# Patient Record
Sex: FEMALE | Race: WHITE | NOT HISPANIC OR LATINO | URBAN - METROPOLITAN AREA
[De-identification: names, ages, dates, MRNs, and addresses within clinical notes are randomized per-mention and may not be internally consistent; named-entity substitution may affect disease eponyms.]

---

## 2020-07-25 ENCOUNTER — TELEPHONE ENCOUNTER (OUTPATIENT)
Dept: URBAN - METROPOLITAN AREA CLINIC 13 | Facility: CLINIC | Age: 70
End: 2020-07-25

## 2020-07-25 RX ORDER — RIFAXIMIN 550 MG/1
TAKE 1 TABLET TWICE DAILY TABLET ORAL
Qty: 28 | Refills: 0 | OUTPATIENT
Start: 2014-06-18 | End: 2015-01-14

## 2020-07-25 RX ORDER — MIRABEGRON 50 MG/1
TAKE 1 TABLET DAILY FOR 6 WEEKS TABLET, FILM COATED, EXTENDED RELEASE ORAL
Qty: 30 | Refills: 0 | OUTPATIENT
Start: 2014-06-24 | End: 2015-01-14

## 2020-07-25 RX ORDER — METOCLOPRAMIDE 5 MG/1
TAKE 1 TABLET 3 TIMES DAILY TABLET ORAL
Qty: 90 | Refills: 1 | OUTPATIENT
Start: 2019-05-22 | End: 2019-07-14

## 2020-07-25 RX ORDER — HYOSCYAMINE SULFATE 0.12 MG/1
PLACE 1 TABLET EVERY 4-6 HOURS PRN ABDOMINAL PAIN TABLET, ORALLY DISINTEGRATING ORAL
Qty: 60 | Refills: 2 | OUTPATIENT
End: 2017-06-15

## 2020-07-25 RX ORDER — NITROGLYCERIN 0.4 MG/1
TABLET SUBLINGUAL
Qty: 25 | Refills: 0 | OUTPATIENT
Start: 2017-04-06 | End: 2018-09-05

## 2020-07-25 RX ORDER — FLUTICASONE PROPIONATE 50 UG/1
SPRAY, METERED NASAL
Qty: 16 | Refills: 0 | OUTPATIENT
Start: 2017-09-06 | End: 2018-09-05

## 2020-07-25 RX ORDER — POLYETHYLENE GLYCOL 3350, SODIUM CHLORIDE, SODIUM BICARBONATE AND POTASSIUM CHLORIDE WITH LEMON FLAVOR 420; 11.2; 5.72; 1.48 G/4L; G/4L; G/4L; G/4L
TAKE 1/2 GALLON AT 5:00 PM DAY BEFORE PROCEDURE, TAKE SECOND 1/2 OF GALLON 6 HRS PRIOR TO PROCEDURE POWDER, FOR SOLUTION ORAL
Qty: 1 | Refills: 0 | OUTPATIENT
Start: 2014-05-15 | End: 2014-06-03

## 2020-07-25 RX ORDER — PANTOPRAZOLE SODIUM 40 MG/1
TAKE 1 TABLET DAILY TABLET, DELAYED RELEASE ORAL
Qty: 30 | Refills: 1 | OUTPATIENT
Start: 2014-10-21 | End: 2016-03-23

## 2020-07-25 RX ORDER — HYOSCYAMINE SULFATE 0.12 MG/1
PLACE 1 TABLET UNDER THE TONGUE EVERY 4 TO 6 HOURS AS NEEDED TABLET, ORALLY DISINTEGRATING ORAL
Qty: 60 | Refills: 3 | OUTPATIENT
Start: 2014-05-15 | End: 2015-01-14

## 2020-07-25 RX ORDER — ACETAMINOPHEN, ASPIRIN (NSAID) AND CAFFEINE 250; 250; 65 MG/1; MG/1; MG/1
TAKE TABLET  PRN TABLET, FILM COATED ORAL
Refills: 0 | OUTPATIENT
End: 2018-09-05

## 2020-07-25 RX ORDER — POLYETHYLENE GLYCOL 3350, SODIUM CHLORIDE, SODIUM BICARBONATE AND POTASSIUM CHLORIDE WITH LEMON FLAVOR 420; 11.2; 5.72; 1.48 G/4L; G/4L; G/4L; G/4L
TAKE  ML AS DIRECTED MIX CONTENTS PER DIRECTIONS, BEGIN DRINKING 1/2 SOLUTION @ 5P, COMPLETE REMAINDER OF SOLUTION 6HR PRIOR TO PROCEDURE POWDER, FOR SOLUTION ORAL
Qty: 1 | Refills: 0 | OUTPATIENT
Start: 2018-01-15 | End: 2018-09-05

## 2020-07-25 RX ORDER — POLYETHYLENE GLYCOL 3350, SODIUM CHLORIDE, SODIUM BICARBONATE AND POTASSIUM CHLORIDE WITH LEMON FLAVOR 420; 11.2; 5.72; 1.48 G/4L; G/4L; G/4L; G/4L
TAKE  ML ONCE MIX CONTENTS AND DRINK 1/2 SOLUTION @ 5PM / DRINK REMAINDER OF SOLUTION 6HR PRIOR TO PROCEDURE POWDER, FOR SOLUTION ORAL
Qty: 1 | Refills: 0 | OUTPATIENT
Start: 2018-10-31 | End: 2018-11-16

## 2020-07-25 RX ORDER — METOPROLOL SUCCINATE 25 MG/1
TAKE 1 TABLET DAILY TABLET, EXTENDED RELEASE ORAL
Refills: 0 | OUTPATIENT
End: 2018-09-05

## 2020-07-25 RX ORDER — ESOMEPRAZOLE MAGNESIUM 40 MG
TAKE 1 CAPSULE DAILY CAPSULE,DELAYED RELEASE (ENTERIC COATED) ORAL
Qty: 1 | Refills: 11 | OUTPATIENT
End: 2017-06-15

## 2020-07-25 RX ORDER — WHEAT DEXTRIN 3 G/4 G
TAKE 1 TBSP DAILY POWDER (GRAM) ORAL
Qty: 1 | Refills: 6 | OUTPATIENT
Start: 2014-05-15 | End: 2014-06-03

## 2020-07-25 RX ORDER — LIDOCAINE HCL AND HYDROCORTISONE ACETATE 20; 20 MG/G; MG/G
USE AS DIRECTED CREAM RECTAL
Qty: 1 | Refills: 1 | OUTPATIENT
Start: 2013-04-08 | End: 2015-01-14

## 2020-07-25 RX ORDER — HYOSCYAMINE SULFATE 0.12 MG/1
TAKE TABLET  PRN TABLET ORAL
Refills: 0 | OUTPATIENT
End: 2018-09-05

## 2020-07-26 ENCOUNTER — TELEPHONE ENCOUNTER (OUTPATIENT)
Dept: URBAN - METROPOLITAN AREA CLINIC 13 | Facility: CLINIC | Age: 70
End: 2020-07-26

## 2020-07-26 RX ORDER — CLONAZEPAM 0.5 MG/1
TABLET ORAL
Qty: 30 | Refills: 0 | Status: ACTIVE | COMMUNITY
Start: 2016-01-25

## 2020-07-26 RX ORDER — METOPROLOL SUCCINATE 25 MG/1
TABLET, EXTENDED RELEASE ORAL
Qty: 30 | Refills: 0 | Status: ACTIVE | COMMUNITY
Start: 2017-04-06

## 2020-07-26 RX ORDER — VALSARTAN 320 MG/1
TABLET ORAL
Qty: 30 | Refills: 0 | Status: ACTIVE | COMMUNITY
Start: 2013-07-02

## 2020-07-26 RX ORDER — LIRAGLUTIDE 6 MG/ML
INJECTION SUBCUTANEOUS
Qty: 6 | Refills: 0 | Status: ACTIVE | COMMUNITY
Start: 2013-07-10

## 2020-07-26 RX ORDER — HYDROCODONE BITARTRATE AND ACETAMINOPHEN 7.5; 325 MG/1; MG/1
TABLET ORAL
Qty: 28 | Refills: 0 | Status: ACTIVE | COMMUNITY
Start: 2018-05-07

## 2020-07-26 RX ORDER — BLOOD SUGAR DIAGNOSTIC
STRIP MISCELLANEOUS
Qty: 100 | Refills: 0 | Status: ACTIVE | COMMUNITY
Start: 2014-03-28

## 2020-07-26 RX ORDER — EMPAGLIFLOZIN 10 MG/1
TAKE 1 TABLET BY MOUTH ONCE DAILY TABLET, FILM COATED ORAL
Refills: 0 | Status: ACTIVE | COMMUNITY

## 2020-07-26 RX ORDER — DICLOFENAC SODIUM 1 %
GEL (GRAM) TOPICAL
Qty: 100 | Refills: 0 | Status: ACTIVE | COMMUNITY
Start: 2018-04-26

## 2020-07-26 RX ORDER — LOSARTAN POTASSIUM 100 MG/1
TAKE 1 TABLET ONCE DAILY TABLET, FILM COATED ORAL
Refills: 0 | Status: ACTIVE | COMMUNITY
Start: 2014-10-21

## 2020-07-26 RX ORDER — BLOOD SUGAR DIAGNOSTIC
STRIP MISCELLANEOUS
Qty: 100 | Refills: 0 | Status: ACTIVE | COMMUNITY
Start: 2015-05-22

## 2020-07-26 RX ORDER — LIRAGLUTIDE 6 MG/ML
INJECTION SUBCUTANEOUS
Qty: 6 | Refills: 0 | Status: ACTIVE | COMMUNITY
Start: 2013-06-03

## 2020-07-26 RX ORDER — MUPIROCIN 20 MG/G
OINTMENT TOPICAL
Qty: 22 | Refills: 0 | Status: ACTIVE | COMMUNITY
Start: 2016-02-24

## 2020-07-26 RX ORDER — PRUCALOPRIDE 1 MG/1
TAKE 1 TABLET DAILY TABLET, FILM COATED ORAL
Qty: 30 | Refills: 5 | Status: ACTIVE | COMMUNITY
Start: 2019-04-29

## 2020-07-26 RX ORDER — CEPHALEXIN 500 MG/1
CAPSULE ORAL
Qty: 21 | Refills: 0 | Status: ACTIVE | COMMUNITY
Start: 2018-08-26

## 2020-07-26 RX ORDER — METFORMIN HYDROCHLORIDE 500 MG/1
TABLET, EXTENDED RELEASE ORAL
Qty: 60 | Refills: 0 | Status: ACTIVE | COMMUNITY
Start: 2013-02-11

## 2020-07-26 RX ORDER — NITROGLYCERIN 0.4 MG/1
TABLET SUBLINGUAL
Qty: 25 | Refills: 0 | Status: ACTIVE | COMMUNITY
Start: 2017-04-06

## 2020-07-26 RX ORDER — AMOXICILLIN AND CLAVULANATE POTASSIUM 875; 125 MG/1; MG/1
TABLET, FILM COATED ORAL
Qty: 20 | Refills: 0 | Status: ACTIVE | COMMUNITY
Start: 2013-06-17

## 2020-07-26 RX ORDER — MELOXICAM 7.5 MG/1
TAKE 1 TABLET TWICE DAILY TABLET ORAL
Refills: 0 | Status: ACTIVE | COMMUNITY
Start: 2020-04-06

## 2020-07-26 RX ORDER — VALSARTAN 320 MG/1
TABLET ORAL
Qty: 30 | Refills: 0 | Status: ACTIVE | COMMUNITY
Start: 2013-08-02

## 2020-07-26 RX ORDER — CLONAZEPAM 0.5 MG/1
TABLET ORAL
Qty: 30 | Refills: 0 | Status: ACTIVE | COMMUNITY
Start: 2017-04-17

## 2020-07-26 RX ORDER — MOMETASONE FUROATE 1 MG/ML
SOLUTION TOPICAL
Qty: 30 | Refills: 0 | Status: ACTIVE | COMMUNITY
Start: 2019-09-25

## 2020-07-26 RX ORDER — PRAVASTATIN SODIUM 40 MG/1
TAKE 1 TABLET DAILY AS DIRECTED TABLET ORAL
Refills: 0 | Status: ACTIVE | COMMUNITY
Start: 2014-10-21

## 2020-07-26 RX ORDER — LIRAGLUTIDE 6 MG/ML
INJECTION SUBCUTANEOUS
Qty: 6 | Refills: 0 | Status: ACTIVE | COMMUNITY
Start: 2013-08-19

## 2020-07-26 RX ORDER — CYCLOSPORINE 0.5 MG/ML
EMULSION OPHTHALMIC
Qty: 6 | Refills: 0 | Status: ACTIVE | COMMUNITY
Start: 2019-01-14

## 2020-07-26 RX ORDER — PANTOPRAZOLE SODIUM 40 MG/1
TAKE 1 TABLET DAILY TABLET, DELAYED RELEASE ORAL
Refills: 11 | Status: ACTIVE | COMMUNITY

## 2020-07-26 RX ORDER — METFORMIN HYDROCHLORIDE 500 MG/1
TABLET, EXTENDED RELEASE ORAL
Qty: 120 | Refills: 0 | Status: ACTIVE | COMMUNITY
Start: 2014-12-08

## 2020-07-26 RX ORDER — MELOXICAM 7.5 MG/1
TABLET ORAL
Qty: 60 | Refills: 0 | Status: ACTIVE | COMMUNITY
Start: 2018-05-07

## 2020-07-26 RX ORDER — GABAPENTIN 300 MG/1
TAKE 1 CAPSULE TWICE DAILY CAPSULE ORAL
Refills: 0 | Status: ACTIVE | COMMUNITY
Start: 2014-10-21

## 2020-07-26 RX ORDER — BLOOD SUGAR DIAGNOSTIC
STRIP MISCELLANEOUS
Qty: 50 | Refills: 0 | Status: ACTIVE | COMMUNITY
Start: 2012-08-15

## 2020-07-26 RX ORDER — MELOXICAM 7.5 MG/1
TABLET ORAL
Qty: 60 | Refills: 0 | Status: ACTIVE | COMMUNITY
Start: 2018-04-05

## 2020-07-26 RX ORDER — VALSARTAN 320 MG/1
TABLET ORAL
Qty: 30 | Refills: 0 | Status: ACTIVE | COMMUNITY
Start: 2012-04-10

## 2020-07-26 RX ORDER — NITROFURANTOIN MONOHYDRATE/MACROCRYSTALLINE 25; 75 MG/1; MG/1
CAPSULE ORAL
Qty: 10 | Refills: 0 | Status: ACTIVE | COMMUNITY
Start: 2019-05-14

## 2020-07-26 RX ORDER — METFORMIN HYDROCHLORIDE 500 MG/1
TABLET, EXTENDED RELEASE ORAL
Qty: 60 | Refills: 0 | Status: ACTIVE | COMMUNITY
Start: 2013-07-02

## 2020-07-26 RX ORDER — LORAZEPAM 0.5 MG/1
TABLET ORAL
Qty: 40 | Refills: 0 | Status: ACTIVE | COMMUNITY
Start: 2015-09-08

## 2020-07-26 RX ORDER — LIRAGLUTIDE 6 MG/ML
INJECT 18 MG DAILY INJECTION SUBCUTANEOUS
Refills: 0 | Status: ACTIVE | COMMUNITY
Start: 2015-01-12

## 2020-07-26 RX ORDER — METFORMIN HYDROCHLORIDE 500 MG/1
TABLET, EXTENDED RELEASE ORAL
Qty: 60 | Refills: 0 | Status: ACTIVE | COMMUNITY
Start: 2013-05-31

## 2020-07-26 RX ORDER — AMOXICILLIN 500 MG/1
CAPSULE ORAL
Qty: 20 | Refills: 0 | Status: ACTIVE | COMMUNITY
Start: 2017-09-06

## 2020-07-26 RX ORDER — METFORMIN HYDROCHLORIDE 500 MG/1
TABLET, EXTENDED RELEASE ORAL
Qty: 60 | Refills: 0 | Status: ACTIVE | COMMUNITY
Start: 2013-08-12

## 2020-07-26 RX ORDER — ASPIRIN 81 MG/1
TAKE 1 TABLET DAILY AS DIRECTED TABLET, DELAYED RELEASE ORAL
Refills: 0 | Status: ACTIVE | COMMUNITY

## 2020-07-26 RX ORDER — EMPAGLIFLOZIN 10 MG/1
TABLET, FILM COATED ORAL
Qty: 30 | Refills: 0 | Status: ACTIVE | COMMUNITY
Start: 2018-08-20

## 2020-07-26 RX ORDER — LEVOTHYROXINE SODIUM 125 UG/1
TABLET ORAL
Qty: 90 | Refills: 0 | Status: ACTIVE | COMMUNITY
Start: 2017-11-17

## 2020-07-26 RX ORDER — CYCLOSPORINE 0.5 MG/ML
INSTILL 1 DROP IN EACH EYE TWICE DAILY EMULSION OPHTHALMIC
Refills: 0 | Status: ACTIVE | COMMUNITY
Start: 2020-01-27

## 2020-07-26 RX ORDER — CEPHALEXIN 500 MG/1
CAPSULE ORAL
Qty: 14 | Refills: 0 | Status: ACTIVE | COMMUNITY
Start: 2017-03-30

## 2020-07-26 RX ORDER — CYCLOBENZAPRINE HYDROCHLORIDE 10 MG/1
TABLET, FILM COATED ORAL
Qty: 20 | Refills: 0 | Status: ACTIVE | COMMUNITY
Start: 2017-12-18

## 2020-07-26 RX ORDER — MUPIROCIN 20 MG/G
OINTMENT TOPICAL
Qty: 22 | Refills: 0 | Status: ACTIVE | COMMUNITY
Start: 2019-09-25

## 2020-07-26 RX ORDER — NYSTATIN 100000 [USP'U]/G
POWDER TOPICAL
Qty: 60 | Refills: 0 | Status: ACTIVE | COMMUNITY
Start: 2019-08-20

## 2020-07-26 RX ORDER — FLUTICASONE PROPIONATE 50 UG/1
SPRAY, METERED NASAL
Qty: 16 | Refills: 0 | Status: ACTIVE | COMMUNITY
Start: 2017-09-06

## 2020-07-26 RX ORDER — FLUCONAZOLE 150 MG/1
TABLET ORAL
Qty: 1 | Refills: 0 | Status: ACTIVE | COMMUNITY
Start: 2017-09-06

## 2020-07-26 RX ORDER — CLONAZEPAM 0.5 MG/1
TABLET ORAL
Qty: 30 | Refills: 0 | Status: ACTIVE | COMMUNITY
Start: 2017-02-06

## 2020-07-26 RX ORDER — CLONAZEPAM 0.5 MG/1
TABLET ORAL
Qty: 30 | Refills: 0 | Status: ACTIVE | COMMUNITY
Start: 2017-03-10

## 2020-07-26 RX ORDER — CEPHALEXIN 500 MG/1
CAPSULE ORAL
Qty: 28 | Refills: 0 | Status: ACTIVE | COMMUNITY
Start: 2016-02-24

## 2020-10-26 ENCOUNTER — WEB ENCOUNTER (OUTPATIENT)
Dept: URBAN - METROPOLITAN AREA CLINIC 113 | Facility: CLINIC | Age: 70
End: 2020-10-26

## 2020-10-26 ENCOUNTER — OFFICE VISIT (OUTPATIENT)
Dept: URBAN - METROPOLITAN AREA CLINIC 113 | Facility: CLINIC | Age: 70
End: 2020-10-26
Payer: MEDICARE

## 2020-10-26 VITALS
HEIGHT: 66 IN | HEART RATE: 93 BPM | WEIGHT: 220 LBS | DIASTOLIC BLOOD PRESSURE: 91 MMHG | BODY MASS INDEX: 35.36 KG/M2 | SYSTOLIC BLOOD PRESSURE: 137 MMHG | TEMPERATURE: 97.5 F

## 2020-10-26 DIAGNOSIS — K58.1 IRRITABLE BOWEL SYNDROME WITH CONSTIPATION: ICD-10-CM

## 2020-10-26 DIAGNOSIS — K59.09 CHRONIC CONSTIPATION: ICD-10-CM

## 2020-10-26 DIAGNOSIS — D36.9 TUBULOVILLOUS ADENOMA: ICD-10-CM

## 2020-10-26 PROBLEM — 440630006: Status: ACTIVE | Noted: 2020-10-26

## 2020-10-26 PROBLEM — 443416007: Status: ACTIVE | Noted: 2020-10-26

## 2020-10-26 PROCEDURE — G9904 DOC MED RSN NO TBCO SCRN: HCPCS | Performed by: INTERNAL MEDICINE

## 2020-10-26 PROCEDURE — G8417 CALC BMI ABV UP PARAM F/U: HCPCS | Performed by: INTERNAL MEDICINE

## 2020-10-26 PROCEDURE — 3017F COLORECTAL CA SCREEN DOC REV: CPT | Performed by: INTERNAL MEDICINE

## 2020-10-26 PROCEDURE — G8427 DOCREV CUR MEDS BY ELIG CLIN: HCPCS | Performed by: INTERNAL MEDICINE

## 2020-10-26 PROCEDURE — 99214 OFFICE O/P EST MOD 30 MIN: CPT | Performed by: INTERNAL MEDICINE

## 2020-10-26 RX ORDER — FLUCONAZOLE 150 MG/1
TABLET ORAL
Qty: 1 | Refills: 0 | Status: ON HOLD | COMMUNITY
Start: 2017-09-06

## 2020-10-26 RX ORDER — PRUCALOPRIDE 1 MG/1
TAKE 1 TABLET DAILY TABLET, FILM COATED ORAL
Qty: 30 | Refills: 5 | Status: ON HOLD | COMMUNITY
Start: 2019-04-29

## 2020-10-26 RX ORDER — MOMETASONE FUROATE 1 MG/ML
SOLUTION TOPICAL
Qty: 30 | Refills: 0 | Status: ON HOLD | COMMUNITY
Start: 2019-09-25

## 2020-10-26 RX ORDER — PRAVASTATIN SODIUM 40 MG/1
TAKE 1 TABLET DAILY AS DIRECTED TABLET ORAL
Refills: 0 | Status: ACTIVE | COMMUNITY
Start: 2014-10-21

## 2020-10-26 RX ORDER — BLOOD SUGAR DIAGNOSTIC
STRIP MISCELLANEOUS
Qty: 50 | Refills: 0 | Status: ON HOLD | COMMUNITY
Start: 2012-08-15

## 2020-10-26 RX ORDER — MELOXICAM 7.5 MG/1
TABLET ORAL
Qty: 60 | Refills: 0 | Status: ON HOLD | COMMUNITY
Start: 2018-04-05

## 2020-10-26 RX ORDER — LORAZEPAM 0.5 MG/1
TABLET ORAL
Qty: 40 | Refills: 0 | Status: ON HOLD | COMMUNITY
Start: 2015-09-08

## 2020-10-26 RX ORDER — MUPIROCIN 20 MG/G
OINTMENT TOPICAL
Qty: 22 | Refills: 0 | Status: ON HOLD | COMMUNITY
Start: 2016-02-24

## 2020-10-26 RX ORDER — DULOXETINE 60 MG/1
1 CAPSULE CAPSULE, DELAYED RELEASE PELLETS ORAL TWICE A DAY
Status: ACTIVE | COMMUNITY

## 2020-10-26 RX ORDER — VALSARTAN 320 MG/1
TABLET ORAL
Qty: 30 | Refills: 0 | Status: ON HOLD | COMMUNITY
Start: 2012-04-10

## 2020-10-26 RX ORDER — CEPHALEXIN 500 MG/1
CAPSULE ORAL
Qty: 28 | Refills: 0 | Status: ON HOLD | COMMUNITY
Start: 2016-02-24

## 2020-10-26 RX ORDER — GABAPENTIN 300 MG/1
TAKE 1 CAPSULE TWICE DAILY CAPSULE ORAL
Refills: 0 | Status: ACTIVE | COMMUNITY
Start: 2014-10-21

## 2020-10-26 RX ORDER — NITROFURANTOIN MONOHYDRATE/MACROCRYSTALLINE 25; 75 MG/1; MG/1
CAPSULE ORAL
Qty: 10 | Refills: 0 | Status: ON HOLD | COMMUNITY
Start: 2019-05-14

## 2020-10-26 RX ORDER — AMOXICILLIN 500 MG/1
CAPSULE ORAL
Qty: 20 | Refills: 0 | Status: ON HOLD | COMMUNITY
Start: 2017-09-06

## 2020-10-26 RX ORDER — LIRAGLUTIDE 6 MG/ML
INJECTION SUBCUTANEOUS
Qty: 6 | Refills: 0 | Status: ON HOLD | COMMUNITY
Start: 2013-06-03

## 2020-10-26 RX ORDER — ASPIRIN 81 MG/1
TAKE 1 TABLET DAILY AS DIRECTED TABLET, DELAYED RELEASE ORAL
Refills: 0 | Status: ON HOLD | COMMUNITY

## 2020-10-26 RX ORDER — AMOXICILLIN AND CLAVULANATE POTASSIUM 875; 125 MG/1; MG/1
TABLET, FILM COATED ORAL
Qty: 20 | Refills: 0 | Status: ON HOLD | COMMUNITY
Start: 2013-06-17

## 2020-10-26 RX ORDER — POLYETHYLENE GLYCOL 3350, SODIUM CHLORIDE, SODIUM BICARBONATE AND POTASSIUM CHLORIDE 420G
AS DIRECTED KIT ORAL ONCE
Qty: 4000 ML | Refills: 0 | OUTPATIENT
Start: 2020-10-26 | End: 2020-10-27

## 2020-10-26 RX ORDER — CYCLOBENZAPRINE HYDROCHLORIDE 10 MG/1
TABLET, FILM COATED ORAL
Qty: 20 | Refills: 0 | Status: ON HOLD | COMMUNITY
Start: 2017-12-18

## 2020-10-26 RX ORDER — CYCLOSPORINE 0.5 MG/ML
EMULSION OPHTHALMIC
Qty: 6 | Refills: 0 | Status: ON HOLD | COMMUNITY
Start: 2019-01-14

## 2020-10-26 RX ORDER — LOSARTAN POTASSIUM 100 MG/1
TAKE 1 TABLET ONCE DAILY TABLET, FILM COATED ORAL
Refills: 0 | Status: ACTIVE | COMMUNITY
Start: 2014-10-21

## 2020-10-26 RX ORDER — METOCLOPRAMIDE 5 MG/1
1 TABLET ON THE TONGUE AND ALLOW TO DISSOLVE BEFORE MEALS TABLET ORAL
Status: ACTIVE | COMMUNITY

## 2020-10-26 RX ORDER — HYDROCODONE BITARTRATE AND ACETAMINOPHEN 7.5; 325 MG/1; MG/1
TABLET ORAL
Qty: 28 | Refills: 0 | Status: ON HOLD | COMMUNITY
Start: 2018-05-07

## 2020-10-26 RX ORDER — FLUTICASONE PROPIONATE 50 UG/1
SPRAY, METERED NASAL
Qty: 16 | Refills: 0 | Status: ON HOLD | COMMUNITY
Start: 2017-09-06

## 2020-10-26 RX ORDER — ALPRAZOLAM 0.25 MG/1
1 TABLET PRN TABLET ORAL
Status: ACTIVE | COMMUNITY

## 2020-10-26 RX ORDER — DICLOFENAC SODIUM 1 %
GEL (GRAM) TOPICAL
Qty: 100 | Refills: 0 | Status: ON HOLD | COMMUNITY
Start: 2018-04-26

## 2020-10-26 RX ORDER — EMPAGLIFLOZIN 10 MG/1
TAKE 1 TABLET BY MOUTH ONCE DAILY TABLET, FILM COATED ORAL
Refills: 0 | Status: ACTIVE | COMMUNITY

## 2020-10-26 RX ORDER — METOPROLOL SUCCINATE 25 MG/1
TABLET, EXTENDED RELEASE ORAL
Qty: 30 | Refills: 0 | Status: ON HOLD | COMMUNITY
Start: 2017-04-06

## 2020-10-26 RX ORDER — LEVOTHYROXINE SODIUM 125 UG/1
TABLET ORAL
Qty: 90 | Refills: 0 | Status: ACTIVE | COMMUNITY
Start: 2017-11-17

## 2020-10-26 RX ORDER — METFORMIN HYDROCHLORIDE 500 MG/1
TABLET, EXTENDED RELEASE ORAL
Qty: 60 | Refills: 0 | Status: ON HOLD | COMMUNITY
Start: 2013-02-11

## 2020-10-26 RX ORDER — CLONAZEPAM 0.5 MG/1
TABLET ORAL
Qty: 30 | Refills: 0 | Status: ON HOLD | COMMUNITY
Start: 2016-01-25

## 2020-10-26 RX ORDER — PANTOPRAZOLE SODIUM 40 MG/1
TAKE 1 TABLET DAILY TABLET, DELAYED RELEASE ORAL
Refills: 11 | Status: ACTIVE | COMMUNITY

## 2020-10-26 RX ORDER — NYSTATIN 100000 [USP'U]/G
POWDER TOPICAL
Qty: 60 | Refills: 0 | Status: ON HOLD | COMMUNITY
Start: 2019-08-20

## 2020-10-26 RX ORDER — NITROGLYCERIN 0.4 MG/1
TABLET SUBLINGUAL
Qty: 25 | Refills: 0 | Status: ON HOLD | COMMUNITY
Start: 2017-04-06

## 2020-10-26 NOTE — PHYSICAL EXAM HENT:
Head, normocephalic, atraumatic, Face, Face within normal limits, Ears, External ears within normal limits, Nose/Nasopharynx, External nose  normal appearance, nares patent, no nasal discharge, Mouth and Throat, Oral cavity appearance normal, Breath odor normal, Lips, Appearance normal generalized

## 2020-10-26 NOTE — HPI-TODAY'S VISIT:
Ms. Sepulveda is a 70-year-old female with a history of diabetes, adenomatous colon polyps, GERD, fatty liver, mixed type irritable bowel syndrome, sleep apnea on CPAP, and anemia presenting for constipation follow-up.  She was last seen April 2020.  She is having more constipation recently despite Miralax and stool softners.  She has a BM every 2-3 days, but has straining and small pebble like stools.  She uses  cleansers as needed.  She has occasional nausea without vomiting.  She denies  abdominal pain, blood  in the stool, weight loss, fevers or chills.  She was previously tried on Motegrity, but insurance did not approve this. She has not tried Linzess, Trulance or Amitiza.  Review of record Smart pill 4/15/19 revealed slightly delayed gastric emptying at 5 hours 3 minutes, she did have a second meal which likely falsely delayed gastric emptying, small bowel transit times were delayed at approximately 12 hours with normal colonic transit times.  EGD 3/8/19 revealed a 3 cm hiatal hernia, otherwise normal esophagus, retained gastric fluid, mild gastritis and normal duodenum. The gastroesophageal flap valve was classified as Hill Grade III.  Colonoscopy 11/16/18: Findings included BBPS 6, removal of a 10 mm tubular adenomatous polyp in the cecum, piecemeal removal of a 15 mm tubulovillous adenoma in the ascending colon, removal of an 8 mm tubular adenoma in the ascending colon, and removal of nine 2 to 6 mm tubular adenomatous and hyperplastic polyps in the ascending and cecum. Repeat surveillance colonoscopy is recommended in one year.

## 2020-11-09 ENCOUNTER — OFFICE VISIT (OUTPATIENT)
Dept: URBAN - METROPOLITAN AREA SURGERY CENTER 25 | Facility: SURGERY CENTER | Age: 70
End: 2020-11-09
Payer: MEDICARE

## 2020-11-09 ENCOUNTER — CLAIMS CREATED FROM THE CLAIM WINDOW (OUTPATIENT)
Dept: URBAN - METROPOLITAN AREA CLINIC 4 | Facility: CLINIC | Age: 70
End: 2020-11-09
Payer: MEDICARE

## 2020-11-09 DIAGNOSIS — Z86.010 H/O ADENOMATOUS POLYP OF COLON: ICD-10-CM

## 2020-11-09 DIAGNOSIS — D12.4 BENIGN NEOPLASM OF DESCENDING COLON: ICD-10-CM

## 2020-11-09 DIAGNOSIS — D12.2 BENIGN NEOPLASM OF ASCENDING COLON: ICD-10-CM

## 2020-11-09 DIAGNOSIS — D12.4 ADENOMA OF DESCENDING COLON: ICD-10-CM

## 2020-11-09 DIAGNOSIS — D12.6 ADENOMA DETERMINED BY COLORECTAL BIOPSY: ICD-10-CM

## 2020-11-09 PROCEDURE — G9936 PMH PLYP/NEO CO/RECT/JUN/ANS: HCPCS | Performed by: INTERNAL MEDICINE

## 2020-11-09 PROCEDURE — 88305 TISSUE EXAM BY PATHOLOGIST: CPT | Performed by: PATHOLOGY

## 2020-11-09 PROCEDURE — 45385 COLONOSCOPY W/LESION REMOVAL: CPT | Performed by: INTERNAL MEDICINE

## 2020-11-09 PROCEDURE — G8907 PT DOC NO EVENTS ON DISCHARG: HCPCS | Performed by: INTERNAL MEDICINE

## 2020-11-09 RX ORDER — FLUCONAZOLE 150 MG/1
TABLET ORAL
Qty: 1 | Refills: 0 | Status: ON HOLD | COMMUNITY
Start: 2017-09-06

## 2020-11-09 RX ORDER — GABAPENTIN 300 MG/1
TAKE 1 CAPSULE TWICE DAILY CAPSULE ORAL
Refills: 0 | Status: ACTIVE | COMMUNITY
Start: 2014-10-21

## 2020-11-09 RX ORDER — CEPHALEXIN 500 MG/1
CAPSULE ORAL
Qty: 28 | Refills: 0 | Status: ON HOLD | COMMUNITY
Start: 2016-02-24

## 2020-11-09 RX ORDER — ASPIRIN 81 MG/1
TAKE 1 TABLET DAILY AS DIRECTED TABLET, DELAYED RELEASE ORAL
Refills: 0 | Status: ON HOLD | COMMUNITY

## 2020-11-09 RX ORDER — NITROGLYCERIN 0.4 MG/1
TABLET SUBLINGUAL
Qty: 25 | Refills: 0 | Status: ON HOLD | COMMUNITY
Start: 2017-04-06

## 2020-11-09 RX ORDER — AMOXICILLIN AND CLAVULANATE POTASSIUM 875; 125 MG/1; MG/1
TABLET, FILM COATED ORAL
Qty: 20 | Refills: 0 | Status: ON HOLD | COMMUNITY
Start: 2013-06-17

## 2020-11-09 RX ORDER — BLOOD SUGAR DIAGNOSTIC
STRIP MISCELLANEOUS
Qty: 50 | Refills: 0 | Status: ON HOLD | COMMUNITY
Start: 2012-08-15

## 2020-11-09 RX ORDER — METOCLOPRAMIDE 5 MG/1
1 TABLET ON THE TONGUE AND ALLOW TO DISSOLVE BEFORE MEALS TABLET ORAL
Status: ACTIVE | COMMUNITY

## 2020-11-09 RX ORDER — DULOXETINE 60 MG/1
1 CAPSULE CAPSULE, DELAYED RELEASE PELLETS ORAL TWICE A DAY
Status: ACTIVE | COMMUNITY

## 2020-11-09 RX ORDER — MOMETASONE FUROATE 1 MG/ML
SOLUTION TOPICAL
Qty: 30 | Refills: 0 | Status: ON HOLD | COMMUNITY
Start: 2019-09-25

## 2020-11-09 RX ORDER — FLUTICASONE PROPIONATE 50 UG/1
SPRAY, METERED NASAL
Qty: 16 | Refills: 0 | Status: ON HOLD | COMMUNITY
Start: 2017-09-06

## 2020-11-09 RX ORDER — HYDROCODONE BITARTRATE AND ACETAMINOPHEN 7.5; 325 MG/1; MG/1
TABLET ORAL
Qty: 28 | Refills: 0 | Status: ON HOLD | COMMUNITY
Start: 2018-05-07

## 2020-11-09 RX ORDER — NITROFURANTOIN MONOHYDRATE/MACROCRYSTALLINE 25; 75 MG/1; MG/1
CAPSULE ORAL
Qty: 10 | Refills: 0 | Status: ON HOLD | COMMUNITY
Start: 2019-05-14

## 2020-11-09 RX ORDER — NYSTATIN 100000 [USP'U]/G
POWDER TOPICAL
Qty: 60 | Refills: 0 | Status: ON HOLD | COMMUNITY
Start: 2019-08-20

## 2020-11-09 RX ORDER — MELOXICAM 7.5 MG/1
TABLET ORAL
Qty: 60 | Refills: 0 | Status: ON HOLD | COMMUNITY
Start: 2018-04-05

## 2020-11-09 RX ORDER — ALPRAZOLAM 0.25 MG/1
1 TABLET PRN TABLET ORAL
Status: ACTIVE | COMMUNITY

## 2020-11-09 RX ORDER — LORAZEPAM 0.5 MG/1
TABLET ORAL
Qty: 40 | Refills: 0 | Status: ON HOLD | COMMUNITY
Start: 2015-09-08

## 2020-11-09 RX ORDER — VALSARTAN 320 MG/1
TABLET ORAL
Qty: 30 | Refills: 0 | Status: ON HOLD | COMMUNITY
Start: 2012-04-10

## 2020-11-09 RX ORDER — CYCLOSPORINE 0.5 MG/ML
EMULSION OPHTHALMIC
Qty: 6 | Refills: 0 | Status: ON HOLD | COMMUNITY
Start: 2019-01-14

## 2020-11-09 RX ORDER — PRAVASTATIN SODIUM 40 MG/1
TAKE 1 TABLET DAILY AS DIRECTED TABLET ORAL
Refills: 0 | Status: ACTIVE | COMMUNITY
Start: 2014-10-21

## 2020-11-09 RX ORDER — LINACLOTIDE 145 UG/1
1 CAPSULE AT LEAST 30 MINUTES BEFORE THE FIRST MEAL OF THE DAY ON AN EMPTY STOMACH CAPSULE, GELATIN COATED ORAL ONCE A DAY
Qty: 90 | Refills: 3 | OUTPATIENT
Start: 2020-11-09 | End: 2021-11-04

## 2020-11-09 RX ORDER — EMPAGLIFLOZIN 10 MG/1
TAKE 1 TABLET BY MOUTH ONCE DAILY TABLET, FILM COATED ORAL
Refills: 0 | Status: ACTIVE | COMMUNITY

## 2020-11-09 RX ORDER — MUPIROCIN 20 MG/G
OINTMENT TOPICAL
Qty: 22 | Refills: 0 | Status: ON HOLD | COMMUNITY
Start: 2016-02-24

## 2020-11-09 RX ORDER — LIRAGLUTIDE 6 MG/ML
INJECTION SUBCUTANEOUS
Qty: 6 | Refills: 0 | Status: ON HOLD | COMMUNITY
Start: 2013-06-03

## 2020-11-09 RX ORDER — PANTOPRAZOLE SODIUM 40 MG/1
TAKE 1 TABLET DAILY TABLET, DELAYED RELEASE ORAL
Refills: 11 | Status: ACTIVE | COMMUNITY

## 2020-11-09 RX ORDER — DICLOFENAC SODIUM 1 %
GEL (GRAM) TOPICAL
Qty: 100 | Refills: 0 | Status: ON HOLD | COMMUNITY
Start: 2018-04-26

## 2020-11-09 RX ORDER — AMOXICILLIN 500 MG/1
CAPSULE ORAL
Qty: 20 | Refills: 0 | Status: ON HOLD | COMMUNITY
Start: 2017-09-06

## 2020-11-09 RX ORDER — METFORMIN HYDROCHLORIDE 500 MG/1
TABLET, EXTENDED RELEASE ORAL
Qty: 60 | Refills: 0 | Status: ON HOLD | COMMUNITY
Start: 2013-02-11

## 2020-11-09 RX ORDER — PRUCALOPRIDE 1 MG/1
TAKE 1 TABLET DAILY TABLET, FILM COATED ORAL
Qty: 30 | Refills: 5 | Status: ON HOLD | COMMUNITY
Start: 2019-04-29

## 2020-11-09 RX ORDER — CLONAZEPAM 0.5 MG/1
TABLET ORAL
Qty: 30 | Refills: 0 | Status: ON HOLD | COMMUNITY
Start: 2016-01-25

## 2020-11-09 RX ORDER — METOPROLOL SUCCINATE 25 MG/1
TABLET, EXTENDED RELEASE ORAL
Qty: 30 | Refills: 0 | Status: ON HOLD | COMMUNITY
Start: 2017-04-06

## 2020-11-09 RX ORDER — LOSARTAN POTASSIUM 100 MG/1
TAKE 1 TABLET ONCE DAILY TABLET, FILM COATED ORAL
Refills: 0 | Status: ACTIVE | COMMUNITY
Start: 2014-10-21

## 2020-11-09 RX ORDER — CYCLOBENZAPRINE HYDROCHLORIDE 10 MG/1
TABLET, FILM COATED ORAL
Qty: 20 | Refills: 0 | Status: ON HOLD | COMMUNITY
Start: 2017-12-18

## 2020-11-09 RX ORDER — LEVOTHYROXINE SODIUM 125 UG/1
TABLET ORAL
Qty: 90 | Refills: 0 | Status: ACTIVE | COMMUNITY
Start: 2017-11-17

## 2020-12-09 ENCOUNTER — OFFICE VISIT (OUTPATIENT)
Dept: URBAN - METROPOLITAN AREA CLINIC 113 | Facility: CLINIC | Age: 70
End: 2020-12-09

## 2021-01-26 ENCOUNTER — OFFICE VISIT (OUTPATIENT)
Dept: URBAN - METROPOLITAN AREA CLINIC 113 | Facility: CLINIC | Age: 71
End: 2021-01-26
Payer: MEDICARE

## 2021-01-26 VITALS
BODY MASS INDEX: 35.36 KG/M2 | HEIGHT: 66 IN | SYSTOLIC BLOOD PRESSURE: 126 MMHG | DIASTOLIC BLOOD PRESSURE: 84 MMHG | TEMPERATURE: 97.7 F | HEART RATE: 93 BPM | WEIGHT: 220 LBS

## 2021-01-26 DIAGNOSIS — K58.2 IRRITABLE BOWEL SYNDROME WITH BOTH CONSTIPATION AND DIARRHEA: ICD-10-CM

## 2021-01-26 DIAGNOSIS — D36.9 TUBULAR ADENOMA: ICD-10-CM

## 2021-01-26 DIAGNOSIS — K59.09 CHRONIC CONSTIPATION: ICD-10-CM

## 2021-01-26 PROCEDURE — 3017F COLORECTAL CA SCREEN DOC REV: CPT | Performed by: INTERNAL MEDICINE

## 2021-01-26 PROCEDURE — G8417 CALC BMI ABV UP PARAM F/U: HCPCS | Performed by: INTERNAL MEDICINE

## 2021-01-26 PROCEDURE — G8427 DOCREV CUR MEDS BY ELIG CLIN: HCPCS | Performed by: INTERNAL MEDICINE

## 2021-01-26 PROCEDURE — 99214 OFFICE O/P EST MOD 30 MIN: CPT | Performed by: INTERNAL MEDICINE

## 2021-01-26 PROCEDURE — G9903 PT SCRN TBCO ID AS NON USER: HCPCS | Performed by: INTERNAL MEDICINE

## 2021-01-26 RX ORDER — ALPRAZOLAM 0.25 MG/1
1 TABLET PRN TABLET ORAL
Status: ON HOLD | COMMUNITY

## 2021-01-26 RX ORDER — ASPIRIN 81 MG/1
TAKE 1 TABLET DAILY AS DIRECTED TABLET, DELAYED RELEASE ORAL
Refills: 0 | Status: ON HOLD | COMMUNITY

## 2021-01-26 RX ORDER — VALSARTAN 320 MG/1
TABLET ORAL
Qty: 30 | Refills: 0 | Status: ON HOLD | COMMUNITY
Start: 2012-04-10

## 2021-01-26 RX ORDER — CYCLOSPORINE 0.5 MG/ML
EMULSION OPHTHALMIC
Qty: 6 | Refills: 0 | Status: ON HOLD | COMMUNITY
Start: 2019-01-14

## 2021-01-26 RX ORDER — LINACLOTIDE 145 UG/1
1 CAPSULE AT LEAST 30 MINUTES BEFORE THE FIRST MEAL OF THE DAY ON AN EMPTY STOMACH CAPSULE, GELATIN COATED ORAL ONCE A DAY
Qty: 90 | Refills: 3 | Status: ACTIVE | COMMUNITY
Start: 2020-11-09 | End: 2021-11-04

## 2021-01-26 RX ORDER — FLUCONAZOLE 150 MG/1
TABLET ORAL
Qty: 1 | Refills: 0 | Status: ON HOLD | COMMUNITY
Start: 2017-09-06

## 2021-01-26 RX ORDER — CYCLOBENZAPRINE HYDROCHLORIDE 10 MG/1
TABLET, FILM COATED ORAL
Qty: 20 | Refills: 0 | Status: ON HOLD | COMMUNITY
Start: 2017-12-18

## 2021-01-26 RX ORDER — PANTOPRAZOLE SODIUM 40 MG/1
TAKE 1 TABLET DAILY TABLET, DELAYED RELEASE ORAL
Refills: 11 | Status: ACTIVE | COMMUNITY

## 2021-01-26 RX ORDER — BLOOD SUGAR DIAGNOSTIC
STRIP MISCELLANEOUS
Qty: 50 | Refills: 0 | Status: ON HOLD | COMMUNITY
Start: 2012-08-15

## 2021-01-26 RX ORDER — PRAVASTATIN SODIUM 40 MG/1
TAKE 1 TABLET DAILY AS DIRECTED TABLET ORAL
Refills: 0 | Status: ACTIVE | COMMUNITY
Start: 2014-10-21

## 2021-01-26 RX ORDER — LEVOTHYROXINE SODIUM 125 UG/1
TABLET ORAL
Qty: 90 | Refills: 0 | Status: ACTIVE | COMMUNITY
Start: 2017-11-17

## 2021-01-26 RX ORDER — NYSTATIN 100000 [USP'U]/G
POWDER TOPICAL
Qty: 60 | Refills: 0 | Status: ON HOLD | COMMUNITY
Start: 2019-08-20

## 2021-01-26 RX ORDER — DICLOFENAC SODIUM 1 %
GEL (GRAM) TOPICAL
Qty: 100 | Refills: 0 | Status: ON HOLD | COMMUNITY
Start: 2018-04-26

## 2021-01-26 RX ORDER — MELOXICAM 7.5 MG/1
TABLET ORAL
Qty: 60 | Refills: 0 | Status: ON HOLD | COMMUNITY
Start: 2018-04-05

## 2021-01-26 RX ORDER — DULOXETINE 60 MG/1
1 CAPSULE CAPSULE, DELAYED RELEASE PELLETS ORAL TWICE A DAY
Status: ACTIVE | COMMUNITY

## 2021-01-26 RX ORDER — CLONAZEPAM 0.5 MG/1
TABLET ORAL
Qty: 30 | Refills: 0 | Status: ON HOLD | COMMUNITY
Start: 2016-01-25

## 2021-01-26 RX ORDER — HYDROCODONE BITARTRATE AND ACETAMINOPHEN 7.5; 325 MG/1; MG/1
TABLET ORAL
Qty: 28 | Refills: 0 | Status: ON HOLD | COMMUNITY
Start: 2018-05-07

## 2021-01-26 RX ORDER — GABAPENTIN 300 MG/1
TAKE 1 CAPSULE TWICE DAILY CAPSULE ORAL
Refills: 0 | Status: ACTIVE | COMMUNITY
Start: 2014-10-21

## 2021-01-26 RX ORDER — LINACLOTIDE 145 UG/1
1 CAPSULE AT LEAST 30 MINUTES BEFORE THE FIRST MEAL OF THE DAY ON AN EMPTY STOMACH CAPSULE, GELATIN COATED ORAL ONCE A DAY
Qty: 90 | Refills: 3 | OUTPATIENT

## 2021-01-26 RX ORDER — PRUCALOPRIDE 1 MG/1
TAKE 1 TABLET DAILY TABLET, FILM COATED ORAL
Qty: 30 | Refills: 5 | Status: ON HOLD | COMMUNITY
Start: 2019-04-29

## 2021-01-26 RX ORDER — LOSARTAN POTASSIUM 100 MG/1
TAKE 1 TABLET ONCE DAILY TABLET, FILM COATED ORAL
Refills: 0 | Status: ACTIVE | COMMUNITY
Start: 2014-10-21

## 2021-01-26 RX ORDER — NITROGLYCERIN 0.4 MG/1
TABLET SUBLINGUAL
Qty: 25 | Refills: 0 | Status: ON HOLD | COMMUNITY
Start: 2017-04-06

## 2021-01-26 RX ORDER — FAMOTIDINE 40 MG/1
1 TABLET AT BEDTIME AS NEEDED TABLET, FILM COATED ORAL ONCE A DAY
Status: ACTIVE | COMMUNITY

## 2021-01-26 RX ORDER — AMOXICILLIN AND CLAVULANATE POTASSIUM 875; 125 MG/1; MG/1
TABLET, FILM COATED ORAL
Qty: 20 | Refills: 0 | Status: ON HOLD | COMMUNITY
Start: 2013-06-17

## 2021-01-26 RX ORDER — EMPAGLIFLOZIN 10 MG/1
TAKE 1 TABLET BY MOUTH ONCE DAILY TABLET, FILM COATED ORAL
Refills: 0 | Status: ON HOLD | COMMUNITY

## 2021-01-26 RX ORDER — LORAZEPAM 0.5 MG/1
TABLET ORAL
Qty: 40 | Refills: 0 | Status: ON HOLD | COMMUNITY
Start: 2015-09-08

## 2021-01-26 RX ORDER — AMOXICILLIN 500 MG/1
CAPSULE ORAL
Qty: 20 | Refills: 0 | Status: ON HOLD | COMMUNITY
Start: 2017-09-06

## 2021-01-26 RX ORDER — METFORMIN HYDROCHLORIDE 500 MG/1
TABLET, EXTENDED RELEASE ORAL
Qty: 60 | Refills: 0 | Status: ON HOLD | COMMUNITY
Start: 2013-02-11

## 2021-01-26 RX ORDER — CEPHALEXIN 500 MG/1
CAPSULE ORAL
Qty: 28 | Refills: 0 | Status: ON HOLD | COMMUNITY
Start: 2016-02-24

## 2021-01-26 RX ORDER — MOMETASONE FUROATE 1 MG/ML
SOLUTION TOPICAL
Qty: 30 | Refills: 0 | Status: ON HOLD | COMMUNITY
Start: 2019-09-25

## 2021-01-26 RX ORDER — NITROFURANTOIN MONOHYDRATE/MACROCRYSTALLINE 25; 75 MG/1; MG/1
CAPSULE ORAL
Qty: 10 | Refills: 0 | Status: ON HOLD | COMMUNITY
Start: 2019-05-14

## 2021-01-26 RX ORDER — FLUTICASONE PROPIONATE 50 UG/1
SPRAY, METERED NASAL
Qty: 16 | Refills: 0 | Status: ON HOLD | COMMUNITY
Start: 2017-09-06

## 2021-01-26 RX ORDER — LIRAGLUTIDE 6 MG/ML
INJECTION SUBCUTANEOUS
Qty: 6 | Refills: 0 | Status: ACTIVE | COMMUNITY
Start: 2013-06-03

## 2021-01-26 RX ORDER — ALPRAZOLAM 0.25 MG/1
1 TABLET PRN TABLET ORAL
Status: ACTIVE | COMMUNITY

## 2021-01-26 RX ORDER — MUPIROCIN 20 MG/G
OINTMENT TOPICAL
Qty: 22 | Refills: 0 | Status: ON HOLD | COMMUNITY
Start: 2016-02-24

## 2021-01-26 RX ORDER — METOCLOPRAMIDE 5 MG/1
1 TABLET ON THE TONGUE AND ALLOW TO DISSOLVE BEFORE MEALS TABLET ORAL
Status: ON HOLD | COMMUNITY

## 2021-01-26 RX ORDER — METOPROLOL SUCCINATE 25 MG/1
TABLET, EXTENDED RELEASE ORAL
Qty: 30 | Refills: 0 | Status: ON HOLD | COMMUNITY
Start: 2017-04-06

## 2021-01-26 NOTE — HPI-TODAY'S VISIT:
Ms. Sepulveda is a 70-year-old female with a history of diabetes, adenomatous colon polyps, GERD, fatty liver, mixed type irritable bowel syndrome, sleep apnea on CPAP, and anemia presenting for Surveillance colonoscopy follow-up.   Colonoscopy 11/9/20 revealed 2 sessile polyps removed from the descending colon ranging in size from 2-3 mm, 2 sessile polyps removed from the cecum ranging in size from 2-4 mm, medium-sized polypectomy scar at the hepatic flexure with residual polypoid tissue that was completely removed, otherwise normal colon and normal terminal ileum.  All the polyps were tubular adenomas.  It was recommended to repeat colonoscopy in 3 years.  Overall she is doing fairly well as on Linzess 145 MCG daily.  She typically has a bowel movement every 3rd day.  She will typically have multiple loose stools throughout the day.  She otherwise denies abdominal pain, nausea, vomiting, change in bowels, blood in the stool or unintentional weight loss.  Over the last one year she has lost over 60 pounds with diet and exercise.  Review of record Smart pill 4/15/19 revealed slightly delayed gastric emptying at 5 hours 3 minutes, she did have a second meal which likely falsely delayed gastric emptying, small bowel transit times were delayed at approximately 12 hours with normal colonic transit times.  EGD 3/8/19 revealed a 3 cm hiatal hernia, otherwise normal esophagus, retained gastric fluid, mild gastritis and normal duodenum. The gastroesophageal flap valve was classified as Hill Grade III.  Colonoscopy 11/16/18: Findings included BBPS 6, removal of a 10 mm tubular adenomatous polyp in the cecum, piecemeal removal of a 15 mm tubulovillous adenoma in the ascending colon, removal of an 8 mm tubular adenoma in the ascending colon, and removal of nine 2 to 6 mm tubular adenomatous and hyperplastic polyps in the ascending and cecum. Repeat surveillance colonoscopy is recommended in one year.

## 2021-04-13 ENCOUNTER — OFFICE VISIT (OUTPATIENT)
Dept: URBAN - METROPOLITAN AREA CLINIC 113 | Facility: CLINIC | Age: 71
End: 2021-04-13
Payer: MEDICARE

## 2021-04-13 ENCOUNTER — DASHBOARD ENCOUNTERS (OUTPATIENT)
Age: 71
End: 2021-04-13

## 2021-04-13 VITALS
DIASTOLIC BLOOD PRESSURE: 71 MMHG | SYSTOLIC BLOOD PRESSURE: 105 MMHG | HEART RATE: 87 BPM | TEMPERATURE: 98.2 F | WEIGHT: 220 LBS | BODY MASS INDEX: 35.36 KG/M2 | HEIGHT: 66 IN

## 2021-04-13 DIAGNOSIS — D36.9 TUBULAR ADENOMA: ICD-10-CM

## 2021-04-13 DIAGNOSIS — K59.09 CHRONIC CONSTIPATION: ICD-10-CM

## 2021-04-13 DIAGNOSIS — K58.2 IRRITABLE BOWEL SYNDROME WITH BOTH CONSTIPATION AND DIARRHEA: ICD-10-CM

## 2021-04-13 PROBLEM — 10743008: Status: ACTIVE | Noted: 2021-01-26

## 2021-04-13 PROCEDURE — 99214 OFFICE O/P EST MOD 30 MIN: CPT | Performed by: INTERNAL MEDICINE

## 2021-04-13 RX ORDER — METFORMIN HYDROCHLORIDE 500 MG/1
TABLET, EXTENDED RELEASE ORAL
Qty: 60 | Refills: 0 | Status: ON HOLD | COMMUNITY
Start: 2013-02-11

## 2021-04-13 RX ORDER — BLOOD SUGAR DIAGNOSTIC
STRIP MISCELLANEOUS
Qty: 50 | Refills: 0 | Status: ON HOLD | COMMUNITY
Start: 2012-08-15

## 2021-04-13 RX ORDER — ASPIRIN 81 MG/1
TAKE 1 TABLET DAILY AS DIRECTED TABLET, DELAYED RELEASE ORAL
Refills: 0 | Status: ON HOLD | COMMUNITY

## 2021-04-13 RX ORDER — PRAVASTATIN SODIUM 40 MG/1
TAKE 1 TABLET DAILY AS DIRECTED TABLET ORAL
Refills: 0 | Status: ACTIVE | COMMUNITY
Start: 2014-10-21

## 2021-04-13 RX ORDER — CYCLOSPORINE 0.5 MG/ML
EMULSION OPHTHALMIC
Qty: 6 | Refills: 0 | Status: ON HOLD | COMMUNITY
Start: 2019-01-14

## 2021-04-13 RX ORDER — NITROFURANTOIN MONOHYDRATE/MACROCRYSTALLINE 25; 75 MG/1; MG/1
CAPSULE ORAL
Qty: 10 | Refills: 0 | Status: ON HOLD | COMMUNITY
Start: 2019-05-14

## 2021-04-13 RX ORDER — FAMOTIDINE 40 MG/1
1 TABLET AT BEDTIME AS NEEDED TABLET, FILM COATED ORAL ONCE A DAY
Status: ACTIVE | COMMUNITY

## 2021-04-13 RX ORDER — NYSTATIN 100000 [USP'U]/G
POWDER TOPICAL
Qty: 60 | Refills: 0 | Status: ON HOLD | COMMUNITY
Start: 2019-08-20

## 2021-04-13 RX ORDER — AMOXICILLIN 500 MG/1
CAPSULE ORAL
Qty: 20 | Refills: 0 | Status: ON HOLD | COMMUNITY
Start: 2017-09-06

## 2021-04-13 RX ORDER — EMPAGLIFLOZIN 10 MG/1
TAKE 1 TABLET BY MOUTH ONCE DAILY TABLET, FILM COATED ORAL
Refills: 0 | Status: ON HOLD | COMMUNITY

## 2021-04-13 RX ORDER — CEPHALEXIN 500 MG/1
CAPSULE ORAL
Qty: 28 | Refills: 0 | Status: ON HOLD | COMMUNITY
Start: 2016-02-24

## 2021-04-13 RX ORDER — LINACLOTIDE 145 UG/1
1 CAPSULE AT LEAST 30 MINUTES BEFORE THE FIRST MEAL OF THE DAY ON AN EMPTY STOMACH CAPSULE, GELATIN COATED ORAL ONCE A DAY
Qty: 90 | Refills: 3 | OUTPATIENT

## 2021-04-13 RX ORDER — MELOXICAM 7.5 MG/1
TABLET ORAL
Qty: 60 | Refills: 0 | Status: ON HOLD | COMMUNITY
Start: 2018-04-05

## 2021-04-13 RX ORDER — FLUCONAZOLE 150 MG/1
TABLET ORAL
Qty: 1 | Refills: 0 | Status: ON HOLD | COMMUNITY
Start: 2017-09-06

## 2021-04-13 RX ORDER — GABAPENTIN 300 MG/1
TAKE 1 CAPSULE TWICE DAILY CAPSULE ORAL
Refills: 0 | Status: ACTIVE | COMMUNITY
Start: 2014-10-21

## 2021-04-13 RX ORDER — FLUTICASONE PROPIONATE 50 UG/1
SPRAY, METERED NASAL
Qty: 16 | Refills: 0 | Status: ON HOLD | COMMUNITY
Start: 2017-09-06

## 2021-04-13 RX ORDER — VALSARTAN 320 MG/1
TABLET ORAL
Qty: 30 | Refills: 0 | Status: ON HOLD | COMMUNITY
Start: 2012-04-10

## 2021-04-13 RX ORDER — AMOXICILLIN AND CLAVULANATE POTASSIUM 875; 125 MG/1; MG/1
TABLET, FILM COATED ORAL
Qty: 20 | Refills: 0 | Status: ON HOLD | COMMUNITY
Start: 2013-06-17

## 2021-04-13 RX ORDER — LORAZEPAM 0.5 MG/1
TABLET ORAL
Qty: 40 | Refills: 0 | Status: ON HOLD | COMMUNITY
Start: 2015-09-08

## 2021-04-13 RX ORDER — DICLOFENAC SODIUM 1 %
GEL (GRAM) TOPICAL
Qty: 100 | Refills: 0 | Status: ON HOLD | COMMUNITY
Start: 2018-04-26

## 2021-04-13 RX ORDER — ALPRAZOLAM 0.25 MG/1
1 TABLET PRN TABLET ORAL
Status: ON HOLD | COMMUNITY

## 2021-04-13 RX ORDER — LINACLOTIDE 145 UG/1
1 CAPSULE AT LEAST 30 MINUTES BEFORE THE FIRST MEAL OF THE DAY ON AN EMPTY STOMACH CAPSULE, GELATIN COATED ORAL ONCE A DAY
Qty: 90 | Refills: 3 | Status: ACTIVE | COMMUNITY

## 2021-04-13 RX ORDER — MUPIROCIN 20 MG/G
OINTMENT TOPICAL
Qty: 22 | Refills: 0 | Status: ON HOLD | COMMUNITY
Start: 2016-02-24

## 2021-04-13 RX ORDER — NITROGLYCERIN 0.4 MG/1
TABLET SUBLINGUAL
Qty: 25 | Refills: 0 | Status: ON HOLD | COMMUNITY
Start: 2017-04-06

## 2021-04-13 RX ORDER — CLONAZEPAM 0.5 MG/1
TABLET ORAL
Qty: 30 | Refills: 0 | Status: ON HOLD | COMMUNITY
Start: 2016-01-25

## 2021-04-13 RX ORDER — PRUCALOPRIDE 1 MG/1
TAKE 1 TABLET DAILY TABLET, FILM COATED ORAL
Qty: 30 | Refills: 5 | Status: ON HOLD | COMMUNITY
Start: 2019-04-29

## 2021-04-13 RX ORDER — METOPROLOL SUCCINATE 25 MG/1
TABLET, EXTENDED RELEASE ORAL
Qty: 30 | Refills: 0 | Status: ON HOLD | COMMUNITY
Start: 2017-04-06

## 2021-04-13 RX ORDER — CETIRIZINE HYDROCHLORIDE 10 MG/1
1 TABLET TABLET, FILM COATED ORAL ONCE A DAY
Status: ACTIVE | COMMUNITY

## 2021-04-13 RX ORDER — LOSARTAN POTASSIUM 100 MG/1
TAKE 1 TABLET ONCE DAILY TABLET, FILM COATED ORAL
Refills: 0 | Status: ACTIVE | COMMUNITY
Start: 2014-10-21

## 2021-04-13 RX ORDER — HYDROCODONE BITARTRATE AND ACETAMINOPHEN 7.5; 325 MG/1; MG/1
TABLET ORAL
Qty: 28 | Refills: 0 | Status: ON HOLD | COMMUNITY
Start: 2018-05-07

## 2021-04-13 RX ORDER — LIRAGLUTIDE 6 MG/ML
INJECTION SUBCUTANEOUS
Qty: 6 | Refills: 0 | Status: ACTIVE | COMMUNITY
Start: 2013-06-03

## 2021-04-13 RX ORDER — ALPRAZOLAM 0.25 MG/1
1 TABLET PRN TABLET ORAL
Status: ACTIVE | COMMUNITY

## 2021-04-13 RX ORDER — PANTOPRAZOLE SODIUM 40 MG/1
TAKE 1 TABLET DAILY TABLET, DELAYED RELEASE ORAL
Refills: 11 | Status: ACTIVE | COMMUNITY

## 2021-04-13 RX ORDER — DULOXETINE 60 MG/1
1 CAPSULE CAPSULE, DELAYED RELEASE PELLETS ORAL TWICE A DAY
Status: ACTIVE | COMMUNITY

## 2021-04-13 RX ORDER — METOCLOPRAMIDE 5 MG/1
1 TABLET ON THE TONGUE AND ALLOW TO DISSOLVE BEFORE MEALS TABLET ORAL
Status: ON HOLD | COMMUNITY

## 2021-04-13 RX ORDER — LEVOTHYROXINE SODIUM 0.17 MG/1
1 TABLET IN THE MORNING ON AN EMPTY STOMACH TABLET ORAL ONCE A DAY
Refills: 0 | Status: ACTIVE | COMMUNITY
Start: 2017-11-17

## 2021-04-13 RX ORDER — MOMETASONE FUROATE 1 MG/ML
SOLUTION TOPICAL
Qty: 30 | Refills: 0 | Status: ON HOLD | COMMUNITY
Start: 2019-09-25

## 2021-04-13 RX ORDER — CYCLOBENZAPRINE HYDROCHLORIDE 10 MG/1
TABLET, FILM COATED ORAL
Qty: 20 | Refills: 0 | Status: ON HOLD | COMMUNITY
Start: 2017-12-18

## 2021-04-13 NOTE — HPI-TODAY'S VISIT:
Ms. Sepulveda is a 70-year-old female with a history of diabetes, adenomatous colon polyps, GERD, fatty liver, mixed type irritable bowel syndrome, sleep apnea on CPAP, and anemia presenting for follow-up of irritable bowel syndrome.  She was last in the office in January.   She continues to struggle with alternating bowel habits with constipation and diarrhea.  She is having a difficult time because they are moving and have been travelling a lot looking for a new house.  She is currently on Linzess 145mcg with intermittent use of 290mcg dosing.  She uses 290mcg around once a week.  She will go 2-3 days without a bowel movement then will take the 290mcg daily.  She typically has 2-3 BMs a week.  She still has a lot of abdominal discomfort mainly when she is more constipated.  She uses Imodium as needed especially when she does not have access to a bathroom.  She denies blood in the stool, nausea, vomiting, fevers or chills.  She is still dietting and has lost 60 pounds over the last few years.    Colonoscopy 11/9/20 revealed 2 sessile polyps removed from the descending colon ranging in size from 2-3 mm, 2 sessile polyps removed from the cecum ranging in size from 2-4 mm, medium-sized polypectomy scar at the hepatic flexure with residual polypoid tissue that was completely removed, otherwise normal colon and normal terminal ileum.  All the polyps were tubular adenomas.  It was recommended to repeat colonoscopy in 3 years.    Review of record Smart pill 4/15/19 revealed slightly delayed gastric emptying at 5 hours 3 minutes, she did have a second meal which likely falsely delayed gastric emptying, small bowel transit times were delayed at approximately 12 hours with normal colonic transit times.  EGD 3/8/19 revealed a 3 cm hiatal hernia, otherwise normal esophagus, retained gastric fluid, mild gastritis and normal duodenum. The gastroesophageal flap valve was classified as Hill Grade III.  Colonoscopy 11/16/18: Findings included BBPS 6, removal of a 10 mm tubular adenomatous polyp in the cecum, piecemeal removal of a 15 mm tubulovillous adenoma in the ascending colon, removal of an 8 mm tubular adenoma in the ascending colon, and removal of nine 2 to 6 mm tubular adenomatous and hyperplastic polyps in the ascending and cecum. Repeat surveillance colonoscopy is recommended in one year.